# Patient Record
Sex: FEMALE | URBAN - METROPOLITAN AREA
[De-identification: names, ages, dates, MRNs, and addresses within clinical notes are randomized per-mention and may not be internally consistent; named-entity substitution may affect disease eponyms.]

---

## 2018-12-20 ENCOUNTER — APPOINTMENT (OUTPATIENT)
Dept: CT IMAGING | Facility: CLINIC | Age: 38
End: 2018-12-20
Attending: EMERGENCY MEDICINE
Payer: COMMERCIAL

## 2018-12-20 ENCOUNTER — HOSPITAL ENCOUNTER (EMERGENCY)
Facility: CLINIC | Age: 38
Discharge: HOME | End: 2018-12-21
Attending: EMERGENCY MEDICINE | Admitting: EMERGENCY MEDICINE
Payer: COMMERCIAL

## 2018-12-20 ENCOUNTER — APPOINTMENT (OUTPATIENT)
Dept: ULTRASOUND IMAGING | Facility: CLINIC | Age: 38
End: 2018-12-20
Attending: EMERGENCY MEDICINE
Payer: COMMERCIAL

## 2018-12-20 DIAGNOSIS — R55 SYNCOPE, UNSPECIFIED SYNCOPE TYPE: ICD-10-CM

## 2018-12-20 LAB
ABO + RH BLD: NORMAL
ABO + RH BLD: NORMAL
ALBUMIN SERPL-MCNC: 3.3 G/DL (ref 3.4–5)
ALP SERPL-CCNC: 45 U/L (ref 40–150)
ALT SERPL W P-5'-P-CCNC: 25 U/L (ref 0–50)
ANION GAP SERPL CALCULATED.3IONS-SCNC: 9 MMOL/L (ref 3–14)
AST SERPL W P-5'-P-CCNC: 22 U/L (ref 0–45)
BASOPHILS # BLD AUTO: 0 10E9/L (ref 0–0.2)
BASOPHILS NFR BLD AUTO: 0.1 %
BILIRUB SERPL-MCNC: 0.2 MG/DL (ref 0.2–1.3)
BUN SERPL-MCNC: 8 MG/DL (ref 7–30)
CALCIUM SERPL-MCNC: 8.7 MG/DL (ref 8.5–10.1)
CHLORIDE SERPL-SCNC: 104 MMOL/L (ref 94–109)
CO2 SERPL-SCNC: 25 MMOL/L (ref 20–32)
CREAT SERPL-MCNC: 0.61 MG/DL (ref 0.52–1.04)
D DIMER PPP FEU-MCNC: 0.9 UG/ML FEU (ref 0–0.5)
DIFFERENTIAL METHOD BLD: NORMAL
EOSINOPHIL # BLD AUTO: 0.1 10E9/L (ref 0–0.7)
EOSINOPHIL NFR BLD AUTO: 0.9 %
ERYTHROCYTE [DISTWIDTH] IN BLOOD BY AUTOMATED COUNT: 13.7 % (ref 10–15)
GFR SERPL CREATININE-BSD FRML MDRD: >90 ML/MIN/{1.73_M2}
GLUCOSE BLDC GLUCOMTR-MCNC: 149 MG/DL (ref 70–99)
GLUCOSE SERPL-MCNC: 139 MG/DL (ref 70–99)
HCT VFR BLD AUTO: 37.9 % (ref 35–47)
HGB BLD-MCNC: 12.6 G/DL (ref 11.7–15.7)
IMM GRANULOCYTES # BLD: 0 10E9/L (ref 0–0.4)
IMM GRANULOCYTES NFR BLD: 0.3 %
INTERPRETATION ECG - MUSE: NORMAL
LYMPHOCYTES # BLD AUTO: 1.6 10E9/L (ref 0.8–5.3)
LYMPHOCYTES NFR BLD AUTO: 15.9 %
MCH RBC QN AUTO: 27 PG (ref 26.5–33)
MCHC RBC AUTO-ENTMCNC: 33.2 G/DL (ref 31.5–36.5)
MCV RBC AUTO: 81 FL (ref 78–100)
MONOCYTES # BLD AUTO: 0.5 10E9/L (ref 0–1.3)
MONOCYTES NFR BLD AUTO: 5 %
NEUTROPHILS # BLD AUTO: 7.8 10E9/L (ref 1.6–8.3)
NEUTROPHILS NFR BLD AUTO: 77.8 %
NRBC # BLD AUTO: 0 10*3/UL
NRBC BLD AUTO-RTO: 0 /100
PLATELET # BLD AUTO: 187 10E9/L (ref 150–450)
POTASSIUM SERPL-SCNC: 3.6 MMOL/L (ref 3.4–5.3)
PROT SERPL-MCNC: 7.2 G/DL (ref 6.8–8.8)
RBC # BLD AUTO: 4.66 10E12/L (ref 3.8–5.2)
SODIUM SERPL-SCNC: 138 MMOL/L (ref 133–144)
SPECIMEN EXP DATE BLD: NORMAL
TROPONIN I SERPL-MCNC: 0.02 UG/L (ref 0–0.04)
WBC # BLD AUTO: 10 10E9/L (ref 4–11)

## 2018-12-20 PROCEDURE — 86901 BLOOD TYPING SEROLOGIC RH(D): CPT | Performed by: OBSTETRICS & GYNECOLOGY

## 2018-12-20 PROCEDURE — 25000125 ZZHC RX 250: Performed by: EMERGENCY MEDICINE

## 2018-12-20 PROCEDURE — 85379 FIBRIN DEGRADATION QUANT: CPT | Performed by: EMERGENCY MEDICINE

## 2018-12-20 PROCEDURE — 86900 BLOOD TYPING SEROLOGIC ABO: CPT | Performed by: OBSTETRICS & GYNECOLOGY

## 2018-12-20 PROCEDURE — 93005 ELECTROCARDIOGRAM TRACING: CPT

## 2018-12-20 PROCEDURE — 80053 COMPREHEN METABOLIC PANEL: CPT | Performed by: EMERGENCY MEDICINE

## 2018-12-20 PROCEDURE — 85025 COMPLETE CBC W/AUTO DIFF WBC: CPT | Performed by: EMERGENCY MEDICINE

## 2018-12-20 PROCEDURE — 76815 OB US LIMITED FETUS(S): CPT

## 2018-12-20 PROCEDURE — 93970 EXTREMITY STUDY: CPT

## 2018-12-20 PROCEDURE — 71260 CT THORAX DX C+: CPT

## 2018-12-20 PROCEDURE — 00000146 ZZHCL STATISTIC GLUCOSE BY METER IP

## 2018-12-20 PROCEDURE — 84484 ASSAY OF TROPONIN QUANT: CPT | Performed by: EMERGENCY MEDICINE

## 2018-12-20 PROCEDURE — 25000128 H RX IP 250 OP 636: Performed by: EMERGENCY MEDICINE

## 2018-12-20 PROCEDURE — 99285 EMERGENCY DEPT VISIT HI MDM: CPT | Mod: 25

## 2018-12-20 RX ORDER — IOPAMIDOL 755 MG/ML
59 INJECTION, SOLUTION INTRAVASCULAR ONCE
Status: COMPLETED | OUTPATIENT
Start: 2018-12-20 | End: 2018-12-20

## 2018-12-20 RX ORDER — SODIUM CHLORIDE 9 MG/ML
1000 INJECTION, SOLUTION INTRAVENOUS CONTINUOUS
Status: DISCONTINUED | OUTPATIENT
Start: 2018-12-20 | End: 2018-12-21 | Stop reason: HOSPADM

## 2018-12-20 RX ADMIN — IOPAMIDOL 59 ML: 755 INJECTION, SOLUTION INTRAVENOUS at 23:45

## 2018-12-20 RX ADMIN — SODIUM CHLORIDE 85 ML: 9 INJECTION, SOLUTION INTRAVENOUS at 23:46

## 2018-12-20 SDOH — HEALTH STABILITY: MENTAL HEALTH: HOW OFTEN DO YOU HAVE A DRINK CONTAINING ALCOHOL?: NEVER

## 2018-12-20 ASSESSMENT — ENCOUNTER SYMPTOMS
FATIGUE: 1
LIGHT-HEADEDNESS: 1
SHORTNESS OF BREATH: 0

## 2018-12-20 ASSESSMENT — MIFFLIN-ST. JEOR: SCORE: 1330.45

## 2018-12-20 NOTE — ED AVS SNAPSHOT
Emergency Department  [address] [place] [id number]  Phone:  [phone]  Fax:  [phone]                                    Chelo Wilkerson   MRN: [id number]    Department:   Emergency Department   Date of Visit:  12/20/2018           After Visit Summary Signature Page    I have received my discharge instructions, and my questions have been answered. I have discussed any challenges I see with this plan with the nurse or doctor.    ..........................................................................................................................................  Patient/Patient Representative Signature      ..........................................................................................................................................  Patient Representative Print Name and Relationship to Patient    ..................................................               ................................................  Date                                   Time    ..........................................................................................................................................  Reviewed by Signature/Title    ...................................................              ..............................................  Date                                               Time          22EPIC Rev 08/18

## 2018-12-21 VITALS
DIASTOLIC BLOOD PRESSURE: 75 MMHG | BODY MASS INDEX: 26.22 KG/M2 | HEART RATE: 82 BPM | TEMPERATURE: 97.8 F | HEIGHT: 63 IN | SYSTOLIC BLOOD PRESSURE: 117 MMHG | RESPIRATION RATE: 18 BRPM | OXYGEN SATURATION: 100 % | WEIGHT: 148 LBS

## 2018-12-21 NOTE — ED PROVIDER NOTES
ED course:  Patient received as a handoff from my partner Dr. Oneil.  On face-to-face evaluation patient reports no new complaints.  CTA of the chest demonstrates no evidence of PE.  No emergent cause for patient's syncopal episode could be determined.  See Dr. Oneil's note for additional detail.  Patient discharged home      Chest CT, IV contrast only - PE protocol   Preliminary Result   IMPRESSION: No visualized pulmonary embolism or other acute findings.      US Lower Extremity Venous Duplex Bilateral   Final Result   IMPRESSION: No DVT.       WILLARD ALBERT MD      US OB >14 Weeks Limited wo Fetal Measurement   Final Result   IMPRESSION: Single live intrauterine pregnancy. No abnormalities are   evident.      WILLARD ALBERT MD          Impression:    ICD-10-CM    1. Syncope, unspecified syncope type R55        Disposition:  Discharged home         Cecilio Rocha,   12/21/18 0032

## 2018-12-21 NOTE — ED PROVIDER NOTES
History     Chief Complaint:  Fall     HPI   Chelo Wilkerson is a 19 week and 4 days pregnant 38 year old female who presents to the emergency department today for evaluation after a fall. The patient reports she was on a walk today with her  where she felt like her pants were tight and felt mildly hungry, but otherwise felt at baseline. She did eat normally today. She was coming back in the house this evening after a walk where she felt light headed and had a witnessed fall where she fell backward hitting her head on the ground. She is unsure what exactly happened They were able to get her up into a chair and she seemed at baseline. About a couple of minutes later, she had two episodes of 5 seconds of leg shaking where she felt mildly confused and did not realize this happened. They then gave her food and water which made her feel better. They were concerned about the fall prompting their visit to the emergency department. She denies shortness of breath, chest pain, increased fatigue, leg swelling. Of note, the patient did fly in from [place] on Tuesday and has been receiving prenatal care in [place].      Allergies:  No Known Drug Allergies    Medications:    The patient is currently on no regular medications.    Past Medical History:    History reviewed. No pertinent past medical history.    Past Surgical History:    History reviewed. No pertinent past surgical history.    Family History:    History reviewed. No pertinent family history.     Social History:  The patient was accompanied to the ED by  and brother.  Smoking Status: Never  Smokeless Tobacco: Never  Alcohol Use: No  Drug use: No    Review of Systems   Constitutional: Positive for fatigue.   Respiratory: Negative for shortness of breath.    Cardiovascular: Negative for chest pain and leg swelling.   Neurological: Positive for syncope and light-headedness.   10 point review of systems performed and is negative except as above and  "in HPI.        Physical Exam     Patient Vitals for the past 24 hrs:   BP Temp Temp src Pulse Resp SpO2 Height Weight   12/20/18 1906 107/67 97.8  F (36.6  C) Oral 62 18 100 % 1.6 m (5' 3\") 67.1 kg (148 lb)         Physical Exam  General: Resting on the gurney, appears comfortable  Head:  The scalp, face, and head appear normal, no evidence of injury.   Mouth/Throat: Mucus membranes are moist  CV:  Regular rate    Normal S1 and S2  No pathological murmur   Resp:  Clear to auscultation    Breath sounds clear and equal bilaterally    Non-labored, no retractions or accessory muscle use    No coarseness    No wheezing   GI:  Abdomen is soft, no rigidity    No tenderness to palpation    Fetal heart tones present, see the nursing note for heart rate  MS:  Normal motor assessment of all extremities.    Good capillary refill noted.    Bilateral lower extremities without redness, swelling, or excess warmth.  Skin:  No rash or lesions noted.  Neuro:   Speech is normal and fluent. No apparent deficit.  Psych: Awake. Alert.  Normal affect.      Appropriate interactions.    Emergency Department Course   ECG:  Indication: Fall  Completed at 1859.   Sinus rhythm with sinus arrhythmia  Possible Left atrial enlargement  Cannot rule out Anterior infarct , age undetermined  Abnormal ECG  Rate 66 bpm. GA interval 128. QRS duration 90. QT/QTc 416/436. P-R-T axes 55  21  16.    Imaging:  Radiology findings were communicated with the patient and family who voiced understanding of the findings.  US OB >14 Weeks Complete w Transvaginal  IMPRESSION: Single live intrauterine pregnancy. No abnormalities are  evident.  Report per radiology     US Lower Extremity Venous Duplex Bilateral  IMPRESSION: No DVT.   Report per radiology     CT Chest IV contrast only-PE Protocol  Pending     Laboratory:  Laboratory findings were communicated with the patient and family who voiced understanding of the findings.  CBC: WNL. (WBC 10.0, HGB 12.6, ) "   CMP: glucose 139 (H), albumin 3.3 (L) o/w WNL (Creatinine 0.61)  D Dimer (Collected 1950): 0.9 (H)  Troponin (Collected 1950): 0.019  Glucose by meter: 149 (H)   Blood type: B+    Emergency Department Course:  Nursing notes and vitals reviewed.  IV was inserted and blood was drawn for laboratory testing, results above.  The patient was sent for a US OB >14 Weeks Complete w Transvaginal, US Lower Extremity Venous Duplex Bilateral, and CT Chest IV contrast only-PE Protocol while in the emergency department, results above.   1925: I performed an exam of the patient as documented above.   2124: I spoke with Dr. Morton of the OB/GYN service from [place], [place] regarding patient's presentation, findings, and plan of care.  2148: Patient rechecked and updated.   2227: Patient rechecked and updated.   I personally reviewed the laboratory and imaging results with the Patient and spouse and answered all related questions prior to sign out.    Impression & Plan    Medical Decision Making:  Chelo Wilkerson is a 38 year old female who presents the emergency department following a syncopal episode.  The patient is 19 weeks pregnant and recently flew here from [place].  She was walking outside when she then returned to the house did not feel well and lost consciousness.  Patient was seen in the emergency department by one of the labor nurses and fetal heart tones were obtained and were reassuring.  The OB nurse discussed the patient with on-call for OB and a blood type was obtained as well as a ultrasound.  On ultrasound,  placenta appeared normal.  Given her syncopal episode, a d-dimer and troponin were obtained.  D-dimer was positive and troponin was detectable but within normal limits.  Given her symptoms I do think that she warranted PE study as she is high risk given pregnancy and cross country travel.  The patient requested that I contact her OB, which I did.  Her OB requested bilateral lower extremity  ultrasounds which were obtained and were unremarkable we then discussed her obtaining a CT and her OB provider agrees that at this stage, CT is indicated.  The patient's CT was not available at the time of this dictation will be followed by my partner Dr. Rocha.  Should this be negative she will be discharged with recommendations for maintaining adequate oral intake and hydration and close outpatient follow-up.  Disposition pending CT imaging.    Diagnosis:    ICD-10-CM    1. Syncope, unspecified syncope type R55        Disposition:  Signed out to Dr. Rocha pending CT imaging    Scribe Disclosure:  Yakov ZAPATA, am serving as a scribe at 7:31 PM on 12/20/2018 to document services personally performed by Ashley Oneil MD based on my observations and the provider's statements to me.     12/20/2018    EMERGENCY DEPARTMENT       Ashley Oneil MD  12/21/18 7901

## 2018-12-21 NOTE — PLAN OF CARE
"L&D RN requested to see patient in ED28 for a fall.   Patient is 19+4 IVF primip who is here from [place], patient arrived on Tuesday 12/18.    Patient states that she fell backwards into the wall and slid down onto her butt. Patient denies abdominal truama with fall.  Patient denies ctx, LOF, bleeding or other obstetrical concerns.    On Monday 12/17 patient states she had her level 2 ultrasound, at that time her placenta was noted to be per pt \"1.8cm from cervix\". She states she has had no bleeding during pregnancy. The rest of US was unremarkable and WDL.     Patient states she has not felt fetal movement during this pregnancy yet.    Doptones obtained at bedside and found to be 130-140s easily audible and regular.     1941-Dr. Collins on call for unassigned patients. MD notified of patient arrival and above assessment. MD informed of orders by ED provider.   Further orders obtained.   If Ultrasound WDL and if blood type positive provider ok with patient discharging from OB care and follow up with primary upon return to [place].     OB RN will update ED provider of new orders and will continue to follow until results come back.       "